# Patient Record
Sex: FEMALE | Race: WHITE | NOT HISPANIC OR LATINO | URBAN - METROPOLITAN AREA
[De-identification: names, ages, dates, MRNs, and addresses within clinical notes are randomized per-mention and may not be internally consistent; named-entity substitution may affect disease eponyms.]

---

## 2023-08-28 ENCOUNTER — HOSPITAL ENCOUNTER (OUTPATIENT)
Dept: DATA CONVERSION | Facility: HOSPITAL | Age: 43
Discharge: HOME | End: 2023-08-28

## 2023-08-28 DIAGNOSIS — Z01.419 ENCOUNTER FOR GYNECOLOGICAL EXAMINATION (GENERAL) (ROUTINE) WITHOUT ABNORMAL FINDINGS: ICD-10-CM

## 2023-08-28 DIAGNOSIS — Z11.51 ENCOUNTER FOR SCREENING FOR HUMAN PAPILLOMAVIRUS (HPV): ICD-10-CM

## 2023-09-15 VITALS
WEIGHT: 139 LBS | BODY MASS INDEX: 21.82 KG/M2 | DIASTOLIC BLOOD PRESSURE: 78 MMHG | SYSTOLIC BLOOD PRESSURE: 118 MMHG | HEIGHT: 67 IN

## 2024-07-17 ENCOUNTER — TELEPHONE (OUTPATIENT)
Dept: OBSTETRICS AND GYNECOLOGY | Facility: CLINIC | Age: 44
End: 2024-07-17
Payer: COMMERCIAL

## 2024-07-17 DIAGNOSIS — Z12.31 ENCOUNTER FOR SCREENING MAMMOGRAM FOR MALIGNANT NEOPLASM OF BREAST: Primary | ICD-10-CM

## 2024-07-17 NOTE — TELEPHONE ENCOUNTER
Previous [facility] patient calling stating she has an annual scheduled for 09/03/2024 and is requesting a mammogram order to be able to schedule for the same day. Message to GABY.

## 2024-08-29 ENCOUNTER — APPOINTMENT (OUTPATIENT)
Dept: OBSTETRICS AND GYNECOLOGY | Facility: CLINIC | Age: 44
End: 2024-08-29
Payer: COMMERCIAL

## 2024-09-03 ENCOUNTER — HOSPITAL ENCOUNTER (OUTPATIENT)
Dept: RADIOLOGY | Facility: CLINIC | Age: 44
Discharge: HOME | End: 2024-09-03
Payer: COMMERCIAL

## 2024-09-03 ENCOUNTER — OFFICE VISIT (OUTPATIENT)
Dept: OBSTETRICS AND GYNECOLOGY | Facility: CLINIC | Age: 44
End: 2024-09-03
Payer: COMMERCIAL

## 2024-09-03 VITALS
BODY MASS INDEX: 22.13 KG/M2 | SYSTOLIC BLOOD PRESSURE: 121 MMHG | HEIGHT: 67 IN | DIASTOLIC BLOOD PRESSURE: 74 MMHG | WEIGHT: 141 LBS

## 2024-09-03 VITALS — HEIGHT: 67 IN | BODY MASS INDEX: 21.66 KG/M2 | WEIGHT: 138 LBS

## 2024-09-03 DIAGNOSIS — Z78.9 ATTEMPTING TO CONCEIVE: ICD-10-CM

## 2024-09-03 DIAGNOSIS — Z15.89 CHEK2 GENE MUTATION POSITIVE: ICD-10-CM

## 2024-09-03 DIAGNOSIS — Z01.419 ENCOUNTER FOR ANNUAL ROUTINE GYNECOLOGICAL EXAMINATION: Primary | ICD-10-CM

## 2024-09-03 DIAGNOSIS — Z12.31 ENCOUNTER FOR SCREENING MAMMOGRAM FOR MALIGNANT NEOPLASM OF BREAST: ICD-10-CM

## 2024-09-03 PROCEDURE — 77067 SCR MAMMO BI INCL CAD: CPT

## 2024-09-03 PROCEDURE — 99396 PREV VISIT EST AGE 40-64: CPT

## 2024-09-03 PROCEDURE — 3008F BODY MASS INDEX DOCD: CPT

## 2024-09-03 PROCEDURE — 77067 SCR MAMMO BI INCL CAD: CPT | Performed by: RADIOLOGY

## 2024-09-03 PROCEDURE — 77063 BREAST TOMOSYNTHESIS BI: CPT | Performed by: RADIOLOGY

## 2024-09-03 ASSESSMENT — LIFESTYLE VARIABLES
SKIP TO QUESTIONS 9-10: 1
HOW OFTEN DO YOU HAVE A DRINK CONTAINING ALCOHOL: NEVER
HOW MANY STANDARD DRINKS CONTAINING ALCOHOL DO YOU HAVE ON A TYPICAL DAY: PATIENT DOES NOT DRINK
AUDIT-C TOTAL SCORE: 0
HOW OFTEN DO YOU HAVE SIX OR MORE DRINKS ON ONE OCCASION: NEVER

## 2024-09-03 ASSESSMENT — ENCOUNTER SYMPTOMS
DEPRESSION: 0
OCCASIONAL FEELINGS OF UNSTEADINESS: 0
LOSS OF SENSATION IN FEET: 0

## 2024-09-03 ASSESSMENT — PATIENT HEALTH QUESTIONNAIRE - PHQ9
1. LITTLE INTEREST OR PLEASURE IN DOING THINGS: NOT AT ALL
SUM OF ALL RESPONSES TO PHQ9 QUESTIONS 1 & 2: 0
2. FEELING DOWN, DEPRESSED OR HOPELESS: NOT AT ALL

## 2024-09-03 ASSESSMENT — PAIN SCALES - GENERAL: PAINLEVEL: 0-NO PAIN

## 2024-09-03 NOTE — PROGRESS NOTES
"Subjective   Ariana Lozada is a 44 y.o. female who is here for a routine GYN exam. Last saw Dr. Maurer 2023, previously saw Dr. Delgado. Doing well overall no concerns or complaints. Menses regular, once monthly. Denies breast or vaginal concerns. Previous discussions with Dr. Delgado and Dr. Maurer about attempting to conceive; she has regular cycles; her and  have previously discussed this and may be more open to trying at this time. They have not been actively trying as of recent. She does have concerns about conceiving in relation to her age / risks.    Complaints:   none  Periods: regular   Dysmenorrhea:  none    Current contraception: condoms  History of abnormal Pap smear: no  History of abnormal mammogram: no      OB History          0    Para   0    Term   0       0    AB   0    Living   0         SAB   0    IAB   0    Ectopic   0    Multiple   0    Live Births   0                  Review of Systems   Constitutional:  Negative for chills, fatigue, fever and unexpected weight change.   Respiratory:  Negative for cough and shortness of breath.    Gastrointestinal:  Negative for abdominal pain, nausea and vomiting.   Genitourinary:  Negative for dyspareunia, dysuria, pelvic pain and vaginal discharge.   Skin:  Negative for color change and rash.   Neurological:  Negative for dizziness and headaches.       Objective   /74   Ht 1.689 m (5' 6.5\")   Wt 64 kg (141 lb)   LMP 2024   BMI 22.42 kg/m²        General:   Alert and oriented, in no acute distress   Neck: Supple. No visible thyromegaly.    Breast/Axilla: Normal to palpation bilaterally without masses, skin changes, or nipple discharge.    Abdomen: Soft, non-tender, without masses or organomegaly   Vulva: Normal architecture without erythema, masses, or lesions.    Vagina: Normal mucosa without lesions, masses, or atrophy. No abnormal vaginal discharge.    Cervix: Normal without masses, lesions, or signs of cervicitis "   Uterus: Normal, mobile, non-enlarged uterus   Adnexa: Normal without masses or lesions   Pelvic Floor normal   Psych Normal affect. Normal mood.      Assessment/Plan   -UTD on pap smear, next due 8/2026.  -Mammogram completed today; results pending. 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  -Briefly rvwd info regarding age related fertility decline and preg outcome risks with age. She is more open to trying now than previous years; recommended [facility] for further evaluation and follow up care if experiencing difficulty conceiving due to age restraints. Referral placed.    Flaca Purdy PA-C

## 2024-09-05 ENCOUNTER — HOSPITAL ENCOUNTER (OUTPATIENT)
Dept: RADIOLOGY | Facility: EXTERNAL LOCATION | Age: 44
Discharge: HOME | End: 2024-09-05

## 2024-09-06 ASSESSMENT — ENCOUNTER SYMPTOMS
ABDOMINAL PAIN: 0
FATIGUE: 0
NAUSEA: 0
UNEXPECTED WEIGHT CHANGE: 0
FEVER: 0
DIZZINESS: 0
CHILLS: 0
HEADACHES: 0
COLOR CHANGE: 0
COUGH: 0
DYSURIA: 0
SHORTNESS OF BREATH: 0
VOMITING: 0

## 2024-09-10 DIAGNOSIS — Z15.89 CHEK2 GENE MUTATION POSITIVE: Primary | ICD-10-CM

## 2024-09-10 DIAGNOSIS — Z91.89 INCREASED RISK OF BREAST CANCER: ICD-10-CM

## 2024-09-10 DIAGNOSIS — R92.2 DENSE BREASTS: ICD-10-CM

## 2024-09-10 DIAGNOSIS — Z80.3 FAMILY HISTORY OF BREAST CANCER: ICD-10-CM

## 2024-09-10 DIAGNOSIS — R92.30 DENSE BREASTS: ICD-10-CM

## 2024-12-30 ENCOUNTER — TELEPHONE (OUTPATIENT)
Dept: OBSTETRICS AND GYNECOLOGY | Facility: CLINIC | Age: 44
End: 2024-12-30
Payer: COMMERCIAL

## 2024-12-30 NOTE — TELEPHONE ENCOUNTER
Last annual 09/03/2024. Patient calling stating at her most recent appt she talked to GABY about getting a breast MRI, discussing a fast MRI. Patient explains when she called to schedule the fast MRI for March they only had a regular breast MRI order in the system. Requesting a fast MRI order to be placed so she can get it scheduled. Message to GABY.

## 2024-12-31 DIAGNOSIS — R92.30 DENSE BREAST TISSUE: ICD-10-CM

## 2024-12-31 DIAGNOSIS — Z15.89 CHEK2 GENE MUTATION POSITIVE: ICD-10-CM

## 2024-12-31 DIAGNOSIS — Z91.89 INCREASED RISK OF BREAST CANCER: Primary | ICD-10-CM

## 2024-12-31 DIAGNOSIS — Z80.3 FAMILY HISTORY OF BREAST CANCER: ICD-10-CM

## 2025-03-25 ENCOUNTER — APPOINTMENT (OUTPATIENT)
Dept: RADIOLOGY | Facility: CLINIC | Age: 45
End: 2025-03-25
Payer: COMMERCIAL

## 2025-03-25 ENCOUNTER — HOSPITAL ENCOUNTER (OUTPATIENT)
Dept: RADIOLOGY | Facility: CLINIC | Age: 45
Discharge: HOME | End: 2025-03-25
Payer: COMMERCIAL

## 2025-03-25 DIAGNOSIS — Z15.89 CHEK2 GENE MUTATION POSITIVE: ICD-10-CM

## 2025-03-25 DIAGNOSIS — Z91.89 INCREASED RISK OF BREAST CANCER: ICD-10-CM

## 2025-03-25 DIAGNOSIS — R92.30 DENSE BREAST TISSUE: ICD-10-CM

## 2025-03-25 DIAGNOSIS — Z80.3 FAMILY HISTORY OF BREAST CANCER: ICD-10-CM

## 2025-03-25 PROCEDURE — A9575 INJ GADOTERATE MEGLUMI 0.1ML: HCPCS

## 2025-03-25 PROCEDURE — 2550000001 HC RX 255 CONTRASTS

## 2025-03-25 PROCEDURE — 6100000003 BI MR BREAST BILATERAL WITH CONTRAST FAST SCREENING SELF PAY

## 2025-03-25 RX ORDER — GADOTERATE MEGLUMINE 376.9 MG/ML
0.2 INJECTION INTRAVENOUS
Status: COMPLETED | OUTPATIENT
Start: 2025-03-25 | End: 2025-03-25

## 2025-03-25 RX ADMIN — GADOTERATE MEGLUMINE 13 ML: 376.9 INJECTION INTRAVENOUS at 15:08

## 2025-03-28 DIAGNOSIS — Z12.31 ENCOUNTER FOR SCREENING MAMMOGRAM FOR MALIGNANT NEOPLASM OF BREAST: Primary | ICD-10-CM

## 2025-06-25 ASSESSMENT — LIFESTYLE VARIABLES
HISTORY_ALCOHOL_USE: NO
TOBACCO_USE: NO

## 2025-06-26 ENCOUNTER — CONSULT (OUTPATIENT)
Dept: ENDOCRINOLOGY | Facility: CLINIC | Age: 45
End: 2025-06-26
Payer: COMMERCIAL

## 2025-06-26 ENCOUNTER — LAB (OUTPATIENT)
Dept: LAB | Facility: HOSPITAL | Age: 45
End: 2025-06-26
Payer: COMMERCIAL

## 2025-06-26 VITALS
SYSTOLIC BLOOD PRESSURE: 110 MMHG | OXYGEN SATURATION: 100 % | DIASTOLIC BLOOD PRESSURE: 59 MMHG | HEART RATE: 63 BPM | RESPIRATION RATE: 18 BRPM | BODY MASS INDEX: 23.08 KG/M2 | HEIGHT: 66 IN | TEMPERATURE: 99.7 F | WEIGHT: 143.6 LBS

## 2025-06-26 DIAGNOSIS — Z01.83 ENCOUNTER FOR RH BLOOD TYPING: ICD-10-CM

## 2025-06-26 DIAGNOSIS — N91.4 SECONDARY OLIGOMENORRHEA: ICD-10-CM

## 2025-06-26 DIAGNOSIS — N97.9 FEMALE INFERTILITY, UNSPECIFIED: ICD-10-CM

## 2025-06-26 DIAGNOSIS — Z78.9 ATTEMPTING TO CONCEIVE: ICD-10-CM

## 2025-06-26 DIAGNOSIS — E28.2 PCOS (POLYCYSTIC OVARIAN SYNDROME): Primary | ICD-10-CM

## 2025-06-26 DIAGNOSIS — Z31.69 PRE-CONCEPTION COUNSELING: ICD-10-CM

## 2025-06-26 DIAGNOSIS — Z01.83 ENCOUNTER FOR BLOOD TYPING: Primary | ICD-10-CM

## 2025-06-26 DIAGNOSIS — Z11.59 ENCOUNTER FOR SCREENING FOR OTHER VIRAL DISEASES: ICD-10-CM

## 2025-06-26 DIAGNOSIS — Z13.1 SCREENING FOR DIABETES MELLITUS: ICD-10-CM

## 2025-06-26 DIAGNOSIS — Z11.3 SCREENING FOR STDS (SEXUALLY TRANSMITTED DISEASES): ICD-10-CM

## 2025-06-26 DIAGNOSIS — Z15.89 CHEK2 GENE MUTATION POSITIVE: ICD-10-CM

## 2025-06-26 LAB
ABO GROUP (TYPE) IN BLOOD: NORMAL
ANTIBODY SCREEN: NORMAL
RH FACTOR (ANTIGEN D): NORMAL

## 2025-06-26 PROCEDURE — 99215 OFFICE O/P EST HI 40 MIN: CPT | Performed by: OBSTETRICS & GYNECOLOGY

## 2025-06-26 PROCEDURE — 86900 BLOOD TYPING SEROLOGIC ABO: CPT

## 2025-06-26 PROCEDURE — 86901 BLOOD TYPING SEROLOGIC RH(D): CPT

## 2025-06-26 PROCEDURE — 36415 COLL VENOUS BLD VENIPUNCTURE: CPT

## 2025-06-26 PROCEDURE — 86850 RBC ANTIBODY SCREEN: CPT

## 2025-06-26 PROCEDURE — 99205 OFFICE O/P NEW HI 60 MIN: CPT | Performed by: OBSTETRICS & GYNECOLOGY

## 2025-06-26 ASSESSMENT — PATIENT HEALTH QUESTIONNAIRE - PHQ9
1. LITTLE INTEREST OR PLEASURE IN DOING THINGS: NOT AT ALL
2. FEELING DOWN, DEPRESSED OR HOPELESS: NOT AT ALL
SUM OF ALL RESPONSES TO PHQ9 QUESTIONS 1 AND 2: 0

## 2025-06-26 ASSESSMENT — COLUMBIA-SUICIDE SEVERITY RATING SCALE - C-SSRS
1. IN THE PAST MONTH, HAVE YOU WISHED YOU WERE DEAD OR WISHED YOU COULD GO TO SLEEP AND NOT WAKE UP?: NO
2. HAVE YOU ACTUALLY HAD ANY THOUGHTS OF KILLING YOURSELF?: NO
6. HAVE YOU EVER DONE ANYTHING, STARTED TO DO ANYTHING, OR PREPARED TO DO ANYTHING TO END YOUR LIFE?: NO

## 2025-06-26 ASSESSMENT — PAIN SCALES - GENERAL: PAINLEVEL_OUTOF10: 0-NO PAIN

## 2025-06-26 NOTE — PATIENT INSTRUCTIONS
ASSESSMENT   44 y.o.  female with  primary infertility x 3 years, suspected ovulation and the following pertinent medical issues: Previous diagnosis of PCOS, CHEK2 mutation. History of significant exercise and amenorrhea in college, now with regular menses.  -Partner with some ambivalence about conceiving   Partner SA: No Assessment, history of testosterone use    COUNSELING  We discussed causes of infertility including hormonal, egg quality issues, structural problems such as endometriosis, adhesions, or tubal problems, uterine factors such as polyps or fibroids, and sperm issues. Reviewed evaluation of such as well. We discussed various methods for achieving pregnancy in some detail including, ovulation induction, insemination, superovulation and IVF.    Discussed options for her including:  IUI or IVF with current partner- may recommend IVF due to age  IUI or IVF with donor sperm if azoospermia or if partner does not want to contribute biological material (He has some concerns about his family history)  Egg freezing- if she/partner are not ready to proceed with fertility treatments yet  Conception as a single woman if not legally  in the future (IUI or IVF with donor sperm), possible egg donor  Egg donor as a couple (partner or donor sperm) if does not want to proceed with fertility treatments in the near future    Patient very concerned about her nephew's developmental delay and possibility of having abnormal pregnancy.  She has previously seen a genetic counselor and had genetic testing for a specific mutation, but it has been a few years and has not had updated counseling recently.    Routine Testing  Fertility Center  STDs Within 1 year   Genetic carrier Waiver/Completed   T&S Within 1 year   AMH Within 1 year   TSH Within 1 year   Rubella/Varicella Within 5 years     PLAN  Orders Placed This Encounter   Procedures    US pelvis transvaginal    [facility] ANTI-MULLERIAN HORMONE ([facility]), FEMALE    TSH  with reflex to Free T4 if abnormal    Type And Screen Is this order related to pregnancy or an upcoming surgery? No    Rubella Antibody, Igg    Varicella Zoster Antibody, Igg    Hepatitis B surface antigen    Hepatitis C Antibody    HIV 1/2 Antigen/Antibody Screen with Reflex to Confirmation    Syphilis Screen with Reflex    C. trachomatis / N. gonorrhoeae, Amplified, Urogenital    Hemoglobin A1C    Testosterone,Free and Total    Dhea-Sulfate    CBC and Auto Differential    Comprehensive Metabolic Panel    Referral to Prenatal Genetics    Referral to Breast Surgery    [facility] Referral to OBGYN Behavioral Health (Pyschotherapy)     Intimate Exam Performed: No, an intimate exam was not performed at this encounter.     GENETIC SCREENING PATIENT  Completed previously--need records    PARTNER  Yes Semen Analysis: Will order if patient schedules intake visit  Yes Genetic screening: Completed previously- need records    FOLLOW UP   Consults: Genetic consult: indicationFamily history of developmental delay, Behavior Medicine consult, and Breast screening clinic  Chart to primary nurse for care coordination and patient check list/education  Enroll in Engaged MD  Take prenatal vitamins, vitamin D 2000 IUs daily  Discussed that pap and mammogram must be updated per ACOG guidelines before treatment can begin  Discussed that treatment cannot proceed until checklist items are complete   6 week follow up with MD  Additional testing for BMI < 18 or > 40: NA  Sperm Donor:      MD Completion:  Ectopic Risk: No  Medically Complex: No    Fertility Plan Update:  Recommend referral to breast clinic for high-risk surveillance- [phone]  Referral to reproductive psychology to discuss reproductive choices with partner (Dr. Pringle or Dr. Trujillo- [phone])  Referral to genetics to discuss prior genetic testing (need to locate results) and nephew's condition and counseling ([phone])   Partner to schedule NP visit for intake and  semen analysis/STD orders  Follow up visit with me to review results and discuss options- including egg freezing vs. Fertility options as above    Eliza Romeo  06/26/2025  11:47 AM

## 2025-06-26 NOTE — PROGRESS NOTES
Visit Type: In Person  MD reviewed, Authorization status not noted.    NEW FERTILITY PATIENT VISIT    Referred by: Flaca Purdy    Accompanied today by: myself     Ariana Lozada is a 44 y.o.  female who presents with   Concerns surrounding my age and being able to conceive      Have you had any concerns about your fertility treatments so far?     What are you goals for today's visit? Concerns surrounding my age and being able to conceive   What causes of infertility have been identified on your workup so far? PCOS   Past Infertility Treatments: No   Please summarize your fertility treatments to date.     As far as you are aware, do you have insurance coverage for fertility diagnostic testing and/or fertility treatments? No    Together x 15 years   x 15 years  Not currently using contraception, no conception x 3 years    Has been complicated with her partner trying to decide if they want to  have children  Partner has had significant depression/anxiety which have prevented him from committing to having children- he is currently in a good place as far as mental health; is currently open to conceiving but still has concerns    PRIOR EVALUATION / TREATMENT  Patient has known CHEK2 mutation per prior notes  Had screening breast MRI  -Plan for annual mammogram + MRI annually    Also diagnosed with PCOS in college due to excess hair growth and ?other criteria    Hysterosalpingogram: None  Saline Infused Sonography: None  GYN Pelvic Ultrasound: None    Prior Labs  None    Relationship Status:   labs    Have you ever been pregnant? No  How many times have you been pregnant?    Have you ever had a miscarriage? No  How many times have you had a miscarriage?       OB Hx     OB History          0    Para   0    Term   0       0    AB   0    Living   0         SAB   0    IAB   0    Ectopic   0    Multiple   0    Live Births   0                 GYN HISTORY   Have you ever been diagnosed with a  sexually transmitted disease? No  Please select all that are applicable:    Have you ever had Pelvic Inflammatory Disease? No  Have you had an abnormal PAP smear? No  Date & Result of last PAP smear:  negative  Have you ever had an abnormal Mammogram? No  Date & result of your last mammogram:  3/2025- FAST MRI, normal  Do you have pelvic pain? No  How many times per week do you have intercourse? once  Do you have pain with intercourse? No  Do you use lubricants with intercourse? none  Do you have pain with bowel movements? No  Do you have pain with a full bladder? No    MENSTRUAL HISTORY  LMP: Other  Menarche: 12 years old  Contraception: None taken  Cycle length: 28  Describe your bleeding: Heavy  Dysmenorrhea: No    ENDOCRINE/INFERTILITY HISTORY  Duration of infertility: 6 months  Coital Activity/week: once  Nipple Discharge: No  Vision changes: No  Headaches: Yes  -Sometimes with periods  Excess hair growth: Yes  -Face  Excessive hair loss: No  Acne: Yes  Oily skin: Yes  Recent weight change  Weight gain: Yes  Weight loss: No  Exercise more than 3 times a week: Yes  -Does weight lifting and running 6x/week  -Used to be an elite runner in college- had amenorrhea at that time, but periods now regular    PMH  Medical History[1]     MEDICATIONS  Medications Ordered Prior to Encounter[2]     PSH  Surgical History[3]     PSYCH HISTORY  Have you ever been diagnosed with a mental health Issue? No  Have you ever been hospitalized for a mental health disorder? No     SOCIAL HISTORY  Social History[4]  Occupation:   Have you ever been incarcerated? No  Do you have a history of domestic violence? No  Do you feel safe at home? Yes  Do you have a history of any negative sexual experience such as incest or rape? No     PARTNER HISTORY  Partner Name: Matheus Neville  Partner : [id number]  Partner email: [email or web address]  Occupation:   Prior fertility history: Low testostone  PMH:  "Depression and Anxiety  PSH: Multiple surgeries from athletic injuries: knee, shoulder, foot, hand,  Smoking:No  Alcohol Use: No  Drug Use: No  Medications: Antidepressant  Injuries: No  STD: No  Please select all that are applicable:    SA: No  SA Results:    -Patient was diagnosed with low testosterone and did testosterone injections for a time, but not currently    FAMILY HISTORY  Family History[5]    CANCER HISTORY    Breast: Yes: Grandmother  Ovarian: No  Colon: No  Endometrial: No    FAMILY VTE HISTORY  Family History of Blood Clots: No    GENETIC HISTORY  Ethnic Background  Patient: White  Partner: White  Genetic Disease in Family  -Nephew has developmental delay and RYRI mutation  Patient has tested negative for this variant (report reviewed under MEDIA TAB 2019)  Patient: No  Partner: No  Birth Defects in Family  Patient: No  Partner: No  Genetic screening performed previously:    Did expanded carrier screening   Patient reports no mutual mutations with partner but I do not see those records scanned in     BMI:   BMI Readings from Last 1 Encounters:   25 23.18 kg/m²     VITALS:  /59   Pulse 63   Temp 37.6 °C (99.7 °F) (Temporal)   Resp 18   Ht 1.676 m (5' 6\")   Wt 65.1 kg (143 lb 9.6 oz)   LMP 2025   SpO2 100%   BMI 23.18 kg/m²     ASSESSMENT   44 y.o.  female with  primary infertility x 3 years, suspected ovulation and the following pertinent medical issues: Previous diagnosis of PCOS, CHEK2 mutation. History of significant exercise and amenorrhea in college, now with regular menses.  -Partner with some ambivalence about conceiving   Partner SA: No Assessment, history of testosterone use    COUNSELING  We discussed causes of infertility including hormonal, egg quality issues, structural problems such as endometriosis, adhesions, or tubal problems, uterine factors such as polyps or fibroids, and sperm issues. Reviewed evaluation of such as well. We discussed various " methods for achieving pregnancy in some detail including, ovulation induction, insemination, superovulation and IVF.    Discussed options for her including:  IUI or IVF with current partner- may recommend IVF due to age  IUI or IVF with donor sperm if azoospermia or if partner does not want to contribute biological material (He has some concerns about his family history)  Egg freezing- if she/partner are not ready to proceed with fertility treatments yet  Conception as a single woman if not legally  in the future (IUI or IVF with donor sperm), possible egg donor  Egg donor as a couple (partner or donor sperm) if does not want to proceed with fertility treatments in the near future    Patient very concerned about her nephew's developmental delay and possibility of having abnormal pregnancy.  She has previously seen a genetic counselor and had genetic testing for a specific mutation, but it has been a few years and has not had updated counseling recently.    Routine Testing  Fertility Center  STDs Within 1 year   Genetic carrier Waiver/Completed   T&S Within 1 year   AMH Within 1 year   TSH Within 1 year   Rubella/Varicella Within 5 years     PLAN  Orders Placed This Encounter   Procedures    US pelvis transvaginal    [facility] ANTI-MULLERIAN HORMONE ([facility]), FEMALE    TSH with reflex to Free T4 if abnormal    Type And Screen Is this order related to pregnancy or an upcoming surgery? No    Rubella Antibody, Igg    Varicella Zoster Antibody, Igg    Hepatitis B surface antigen    Hepatitis C Antibody    HIV 1/2 Antigen/Antibody Screen with Reflex to Confirmation    Syphilis Screen with Reflex    C. trachomatis / N. gonorrhoeae, Amplified, Urogenital    Hemoglobin A1C    Testosterone,Free and Total    Dhea-Sulfate    CBC and Auto Differential    Comprehensive Metabolic Panel    Referral to Prenatal Genetics    Referral to Breast Surgery    [facility] Referral to OBGYN Behavioral Health (Pyschotherapy)     Intimate Exam  Performed: No, an intimate exam was not performed at this encounter.     GENETIC SCREENING PATIENT  Completed previously--need records    PARTNER  Yes Semen Analysis: Will order if patient schedules intake visit  Yes Genetic screening: Completed previously- need records    FOLLOW UP   Consults: Genetic consult: indicationFamily history of developmental delay, Behavior Medicine consult, and Breast screening clinic  Chart to primary nurse for care coordination and patient check list/education  Enroll in Engaged MD  Take prenatal vitamins, vitamin D 2000 IUs daily  Discussed that pap and mammogram must be updated per ACOG guidelines before treatment can begin  Discussed that treatment cannot proceed until checklist items are complete   6 week follow up with MD  Additional testing for BMI < 18 or > 40: NA  Sperm Donor:      MD Completion:  Ectopic Risk: No  Medically Complex: No    Fertility Plan Update:  Recommend referral to breast clinic for high-risk surveillance- [phone]  Referral to reproductive psychology to discuss reproductive choices with partner (Dr. Pringle or Dr. Trujillo- [phone])  Referral to genetics to discuss prior genetic testing (need to locate results) and nephew's condition and counseling ([phone])   Partner to schedule NP visit for intake and semen analysis/STD orders  Follow up visit with me to review results and discuss options- including egg freezing vs. Fertility options as above    Eliza Romeo  06/26/2025  11:47 AM         [1]   Past Medical History:  Diagnosis Date    Mutation in CHEK2 gene    [2]   Current Outpatient Medications on File Prior to Visit   Medication Sig Dispense Refill    multivit,calc,mins/iron/folic (WOMEN'S ONE DAILY ORAL) as directed Orally       No current facility-administered medications on file prior to visit.   [3] History reviewed. No pertinent surgical history.  [4]   Social History  Tobacco Use    Smoking status: Never    Smokeless tobacco:  Never   Vaping Use    Vaping status: Never Used   Substance Use Topics    Alcohol use: Never    Drug use: Never   [5]   Family History  Problem Relation Name Age of Onset    No Known Problems Mother      No Known Problems Father      Breast cancer Maternal Grandmother  82    Breast cancer Paternal Cousin

## 2025-06-27 LAB
BASOPHILS # BLD AUTO: 21 CELLS/UL (ref 0–200)
BASOPHILS NFR BLD AUTO: 0.6 %
EOSINOPHIL # BLD AUTO: 60 CELLS/UL (ref 15–500)
EOSINOPHIL NFR BLD AUTO: 1.7 %
ERYTHROCYTE [DISTWIDTH] IN BLOOD BY AUTOMATED COUNT: 13.8 % (ref 11–15)
HCT VFR BLD AUTO: 37.1 % (ref 35–45)
HGB BLD-MCNC: 11.7 G/DL (ref 11.7–15.5)
LYMPHOCYTES # BLD AUTO: 784 CELLS/UL (ref 850–3900)
LYMPHOCYTES NFR BLD AUTO: 22.4 %
MCH RBC QN AUTO: 28.7 PG (ref 27–33)
MCHC RBC AUTO-ENTMCNC: 31.5 G/DL (ref 32–36)
MCV RBC AUTO: 90.9 FL (ref 80–100)
MONOCYTES # BLD AUTO: 536 CELLS/UL (ref 200–950)
MONOCYTES NFR BLD AUTO: 15.3 %
NEUTROPHILS # BLD AUTO: 2100 CELLS/UL (ref 1500–7800)
NEUTROPHILS NFR BLD AUTO: 60 %
PLATELET # BLD AUTO: 284 THOUSAND/UL (ref 140–400)
PMV BLD REES-ECKER: 11 FL (ref 7.5–12.5)
RBC # BLD AUTO: 4.08 MILLION/UL (ref 3.8–5.1)
WBC # BLD AUTO: 3.5 THOUSAND/UL (ref 3.8–10.8)

## 2025-06-28 LAB
C TRACH RRNA SPEC QL NAA+PROBE: NOT DETECTED
DHEA-S SERPL-MCNC: 446 MCG/DL (ref 15–205)
EST. AVERAGE GLUCOSE BLD GHB EST-MCNC: 108 MG/DL
EST. AVERAGE GLUCOSE BLD GHB EST-SCNC: 6 MMOL/L
HBA1C MFR BLD: 5.4 %
HBV SURFACE AG SERPL QL IA: NORMAL
HCV AB SERPL QL IA: NORMAL
HIV 1+2 AB+HIV1 P24 AG SERPL QL IA: NORMAL
HIV 1+2 AB+HIV1 P24 AG SERPL QL IA: NORMAL
MIS SERPL-MCNC: 2.65 NG/ML (ref 0.01–2.99)
N GONORRHOEA RRNA SPEC QL NAA+PROBE: NOT DETECTED
QUEST GC CT AMPLIFIED (ALWAYS MESSAGE): NORMAL
RUBV IGG SERPL IA-ACNC: 0.9 INDEX
T PALLIDUM AB SER QL IA: NORMAL
TESTOST FREE SERPL-MCNC: NORMAL PG/ML
TESTOST SERPL-MCNC: NORMAL NG/DL
TSH SERPL-ACNC: 0.96 MIU/L
VZV IGG SER IA-ACNC: 7.14 S/CO

## 2025-07-01 LAB
C TRACH RRNA SPEC QL NAA+PROBE: NOT DETECTED
DHEA-S SERPL-MCNC: 446 MCG/DL (ref 15–205)
EST. AVERAGE GLUCOSE BLD GHB EST-MCNC: 108 MG/DL
EST. AVERAGE GLUCOSE BLD GHB EST-SCNC: 6 MMOL/L
HBA1C MFR BLD: 5.4 %
HBV SURFACE AG SERPL QL IA: NORMAL
HCV AB SERPL QL IA: NORMAL
HIV 1+2 AB+HIV1 P24 AG SERPL QL IA: NORMAL
HIV 1+2 AB+HIV1 P24 AG SERPL QL IA: NORMAL
MIS SERPL-MCNC: 2.65 NG/ML (ref 0.01–2.99)
N GONORRHOEA RRNA SPEC QL NAA+PROBE: NOT DETECTED
QUEST GC CT AMPLIFIED (ALWAYS MESSAGE): NORMAL
RUBV IGG SERPL IA-ACNC: 0.9 INDEX
T PALLIDUM AB SER QL IA: NEGATIVE
TESTOST FREE SERPL-MCNC: 1.7 PG/ML (ref 0.1–6.4)
TESTOST SERPL-MCNC: 22 NG/DL (ref 2–45)
TSH SERPL-ACNC: 0.96 MIU/L
VZV IGG SER IA-ACNC: 7.14 S/CO

## 2025-07-03 ENCOUNTER — TELEPHONE (OUTPATIENT)
Dept: ENDOCRINOLOGY | Facility: CLINIC | Age: 45
End: 2025-07-03
Payer: COMMERCIAL

## 2025-07-03 NOTE — TELEPHONE ENCOUNTER
Eliza Romeo MD  [facility] [id number] [facility] Clinical Support Staff  Please let patient know she is rubella equivocal. I do not believe she is planning to conceive right away but she should consider an MMR booster prior to conception.    You can also let her know DHEAS is slightly elevated and we will discuss at her follow up visit.    Thank you    Called the patient there was no answer left a message to call the office    ERNIE GARCIA 07/03/25 8:51 AM    Called the patient there was no answer left a message to call the office    ERNIE GARCIA 07/03/25 2:17 PM

## 2025-07-07 ENCOUNTER — ANCILLARY PROCEDURE (OUTPATIENT)
Dept: ENDOCRINOLOGY | Facility: CLINIC | Age: 45
End: 2025-07-07
Payer: COMMERCIAL

## 2025-07-07 DIAGNOSIS — E28.2 PCOS (POLYCYSTIC OVARIAN SYNDROME): ICD-10-CM

## 2025-07-07 PROCEDURE — 76830 TRANSVAGINAL US NON-OB: CPT

## 2025-07-07 PROCEDURE — 76830 TRANSVAGINAL US NON-OB: CPT | Performed by: OBSTETRICS & GYNECOLOGY

## 2025-07-22 ENCOUNTER — TELEMEDICINE (OUTPATIENT)
Dept: ENDOCRINOLOGY | Facility: CLINIC | Age: 45
End: 2025-07-22
Payer: COMMERCIAL

## 2025-07-22 DIAGNOSIS — Z31.62 ENCOUNTER FOR FERTILITY PRESERVATION COUNSELING: Primary | ICD-10-CM

## 2025-07-22 PROCEDURE — 99215 OFFICE O/P EST HI 40 MIN: CPT | Performed by: OBSTETRICS & GYNECOLOGY

## 2025-07-22 NOTE — PROGRESS NOTES
Virtual or Telephone Consent: An interactive audio and video telecommunication system which permits real time communications between the patient (at the originating site) and provider (at the distant site) was utilized to provide this telehealth service and Verbal consent was requested and obtained from Ariana Lozada on this date, 25 for a telehealth visit.  MD reviewed, Authorization status not noted.    Follow Up Visit HPI    Patient is a 45 y.o.  female with infertility presenting today for follow up visit.     Previous diagnosis of PCOS, CHEK2 mutation. History of significant exercise and amenorrhea in college, now with regular menses.  -Partner with some ambivalence about conceiving   Partner SA: No Assessment, history of testosterone use    Has not yet had follow up visits with genetics or Dr. Pringle/psychology as recommended; also referred to high risk breast center    Interval history-  Partner is having hand surgery soon so has not yet been able to schedule his semen analysis    Testing to date:    Latest Reference Range & Units Most Recent   HEMOGLOBIN A1c <5.7 % 5.4  25 12:56   eAG (mg/dL) mg/dL 108  25 12:56   eAG (mmol/L) mmol/L 6.0  25 12:56   TSH mIU/L 0.96  25 12:56   DHEA SULFATE 15 - 205 mcg/dL 446 (H)  25 12:56   ANTI-MULLERIAN HORMONE (AMH), MALE 0.01 - 2.99 ng/mL 2.65  25 12:56   TESTOSTERONE, TOTAL, MS 2 - 45 ng/dL 22  25 12:56   TESTOSTERONE, FREE 0.1 - 6.4 pg/mL 1.7  25 12:56   (H): Data is abnormally high    Hysterosalpingogram: None  Saline Infused Sonography: None  GYN Pelvic Ultrasound: US PELVIS TRANSVAGINAL (2025):   Retroverted uterus with a luteal appearing endometrial lining that measures as below. The left ovary contains a hemorrhagic corpus luteum that measures as seen below. In  the left adnexa, suspected paratubal cysts are appreciated as seen below. The right ovary is normal in appearance.  AFC=10+  bilaterally    Partner SA: No Assessment  Partner Name: Matheus Lozada  Partner : 81  -previously diagnosed with low testosterone; prior testosterone use      Genetic screening Hx  Did expanded carrier screening   Patient reports no mutual mutations with partner but I do not see those records scanned in    -Nephew has developmental delay and RYRI mutation  Patient has tested negative for this variant (report reviewed under MEDIA TAB 2019)    Treatment to date:  None      Medical History[1]  Surgical History[2]  Medications Ordered Prior to Encounter[3]    BMI:   BMI Readings from Last 1 Encounters:   25 23.18 kg/m²     VITALS:  LMP 2025   LMP: Patient's last menstrual period was 2025.    ASSESSMENT   45 y.o.  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  Partner with ambivalence about conceiving and history of testosterone use, no semen analysis yet    COUNSELING  We reviewed that egg freezing would be excellent option given that patient is not ready to attempt conception and has a high AMH for her age.  We reviewed that age-related aneuploidy would still be significant factor and we would need a large number of eggs to give her a reasonable chance of success.     We discussed options for fertility preservation including oocyte cryopreservation and embryo cryopreservation, and discussed advances in cryopreservation specifically the optimization of vitrification to enhance oocyte freezing and enhance the likelihood of pregnancies after thaw.  Discussed with patient the clinical data that currently exists about efficacy of oocyte cryopreservation as a strategy for fertility preservation and that this is an evolving area. At present several concepts have been demonstrated. Increased maternal age likely  impacts total egg yield and likelihood of pregnancy after thaw. Offspring outcomes appear to be comparable for children conceived after IVF and oocyte freezing/thaw/IVF but research in this area is evolving and much more outcomes data exists for children conceived after embryo cryo than oocyte cryo. An upper limit on duration of time that oocytes can be frozen and still result in a pregnancy has not been defined. There is no guarantee for a pregnancy with any amount of oocytes cryopreserved. Reviewed nature of stimulation, injectable hormones used, and monitoring process and the process of oocyte retrieval as an outpatient surgical procedure to harvest oocytes. Risks of this procedure include ovarian hyperstimulation syndrome, anesthesia risks during egg retrieval. Reviewed need to consult with financial specialists in our office to delineate coverage and costs.  We reviewed IVF and discussed the following:  Stimulation Protocols  Oocyte retrieval, risks   Cryopreservation  Subsequent In-vitro fertilization and embryo transfer  Statistics for success  ICSI/ Assisted hatching  Risks of OHSS  Dropped cycles  Use of birth control  Team of physicians  Informed consent procedure  Folic acid supplementation    ART Cycle Plan    1. Protocol/Fertility Plan Update:   Lead in: OCP  Stimulation protocol: Antagonist /Menopur 150  Trigger plan: HCG vs Lupron  Pre-retrieval meds: Antibiotics per protocol  Adjuncts: None  Notes:     2. FET: TBD     3. Insemination:  Sperm source: na  Sperm collection method: N/A  Notes:  ICSI: No  # of oocytes to be fertilized: na    4. Transfer: TBD    5. Cryopreservation plan  PGT: No   Oocyte cryopreservation? Yes, Freeze mature eggs only    6. Patient willing to accept blood transfusion: Yes    7. RN to review chart, initiate IVF boarding pass, and assure completion of the following prior to proceeding with IVF stimulation:       No orders of the defined types were placed in this  encounter.      STDs (Hepatitis B, Hepatitis C, HIV, Syphilis, GC/CT) for patient and partner (if     applicable) to  be completed within the last year (z11.3)  Genetic carrier testing: waiver or carrier screen completed with clearance    documentation   by provider for both patient and partner (Z13.71)  CBC if not performed within the last month (Z01.81)  Type & Screen if not performed within the last month(z01.83)  AMH to be completed within the last year (z31.41)  Pre-IVF Imaging: Reference any orders placed by provider  Frozen sperm sample: ensure frozen partner sample (z31.41) or verify donor sperm on   site  prior to stimulation start date.  Verify in EMR or obtain copy of patient’s last mammogram (if applicable) and pap smear  results for provider review in boarding pass.  Enroll in Engaged MD and complete annual consent forms for IVF and cryotransport  Agreements.  Consults: nurse consult and financial consult  Additional consults Genetics consult, Behavior Medicine consult, and Specialist clearance Breast center for high risk for breast cancer and review what is in the boarding  Pass. NOTE: All above consults are OPTIONAL prior to egg freezing.  Will be required prior to using eggs    Intimate Exam Performed: No, an intimate exam was not performed at this encounter.     Patient would like to take some time to confirm plans for egg freezing  Will reach out when she is ready to proceed  Reviewed that if she is not ready for egg freezing now can consider donor egg in the future    Eliza S Agueda  07/22/2025  1:31 PM       [1]   Past Medical History:  Diagnosis Date    Mutation in CHEK2 gene    [2] No past surgical history on file.  [3]   Current Outpatient Medications on File Prior to Visit   Medication Sig Dispense Refill    multivit,calc,mins/iron/folic (WOMEN'S ONE DAILY ORAL) as directed Orally       No current facility-administered medications on file prior to visit.

## 2025-07-29 NOTE — PROGRESS NOTES
Boarding Pass IVF    Age: 45 y.o.    Provider: Eliza Romeo MD  Primary RN: Regina Eisenberg  Reasons for Treatment: Oocyte Banking  Last BMI  25 : 23.18 kg/m²       Medical History[1]    Date Done Consultation Results/Comments   2025 Medication Protocol Protocol/Fertility Plan Update:   Lead in: OCP  Stimulation protocol: Antagonist /Menopur 150  Trigger plan: HCG vs Lupron  Pre-retrieval meds: Antibiotics per protocol  Adjuncts: None  Notes:    *** Authorization to Share []      2025 IVF Consult  [x]    PGT-A/M? N/A   *** IVF Information and Authorization (to be completed annually) Received and in chart: {CHRIS Yes (Name):71383}   *** [facility] Waiver (Out) Form Received and in chart: {CHRIS Yes (Name):07368}   *** [employer] Packet []    2025 Procedure Order Placed [x]       *** MFM Consult Okay to proceed? {CHRIS Yes, No, N/A:77007}   *** Psych Consult Okay to proceed? {CHRIS Yes, No, N/A:53074}   *** Genetics Consult Okay to proceed? {CHRIS Yes, No, N/A:74517}    Specialist Clearance - Breast Center for high risk breast cancer    *** Other    Date Done Female Labs Results/Comments   2025 T&S (Q 1 Year) ABO: A  Rh: POS  Antibody: NEG     2025 Hep B sAg NON-REACTIVE   2025 Hep C AB NON-REACTIVE   2025 HIV NON-REACTIVE   2025 Syphilis Negative   2025 GC/CT GC: NOT DETECTED  CT: NOT DETECTED   No results found for requested labs within last 1825 days. Rubella (Q 5 Years) No results found for requested labs within last 1825 days.   2025 Varicella (Q 5 Years) 7.14   2025 TSH 0.96 (Ref range: mIU/L)   2025 HgbA1C 5.4 (Ref range: <5.7 %)   2025 AMH 2.65   *** Carrier Screening Myriad 2bP: ***  {CHRIS Carrier Screenin}  {CHRIS Carrier Screening Neg/Pos:82208}    Uterine Cavity Eval Pelvic US: ***    HSG: ***    SIS: ***    Hyster: (***)     2023 Pap Smear     2024 Mammogram ( > 40) *Assessment  Overall: 2 - Benign  Left:  -   Right:  -    *Recommendation(s): Annual Screening                Date Done Male Labs   Results/Comments  ***   *** Hep B sAg ***   *** Hep C AB  ***   *** HIV ***   *** Syphilis ***   *** GC/CT GC: ***  CT: ***   *** Carrier Screening ***  {CHRIS Carrier Screenin}  {CHRIS Carrier Screening Neg/Pos:02794}   *** Semen Analysis  Volume(mL): ***  Concentration(million/mL): ***  Motility(%): ***  Motile Count(million): ***   *** Sperm Freeze  # of vials: ***  TMS post thaw: ***   *** Sperm Prep order pended {CHRIS Yes, No, N/A:04241}   Date Done Miscellaneous Results/Comments   *** BMI Checklist  BMI > 40 or < 18 Added to chart:   {CHRIS BMI Checklist Yes/No:24041}   *** >= 45 Checklist  Added to chart:   {CHRIS 45 and Older Checklist Yes/No:63549}   **Does not need to be completed prior to placing on IVF calendar**    MD Completion:  PAT needed: {YES/NO/NA:70770}  Ectopic Risk: {YES/NO/NA:35373}  Medically Complex: {YES/NO/NA:28211}       [1]   Past Medical History:  Diagnosis Date    Mutation in CHEK2 gene

## 2025-09-04 ENCOUNTER — HOSPITAL ENCOUNTER (OUTPATIENT)
Dept: RADIOLOGY | Facility: CLINIC | Age: 45
Discharge: HOME | End: 2025-09-04
Payer: COMMERCIAL

## 2025-09-04 DIAGNOSIS — Z12.31 ENCOUNTER FOR SCREENING MAMMOGRAM FOR MALIGNANT NEOPLASM OF BREAST: ICD-10-CM

## 2025-09-04 PROCEDURE — 77067 SCR MAMMO BI INCL CAD: CPT | Performed by: RADIOLOGY

## 2025-09-04 PROCEDURE — 77067 SCR MAMMO BI INCL CAD: CPT

## 2025-09-04 PROCEDURE — 77063 BREAST TOMOSYNTHESIS BI: CPT | Performed by: RADIOLOGY

## 2025-10-16 ENCOUNTER — APPOINTMENT (OUTPATIENT)
Dept: ENDOCRINOLOGY | Facility: CLINIC | Age: 45
End: 2025-10-16
Payer: COMMERCIAL